# Patient Record
Sex: FEMALE | Employment: FULL TIME | URBAN - METROPOLITAN AREA
[De-identification: names, ages, dates, MRNs, and addresses within clinical notes are randomized per-mention and may not be internally consistent; named-entity substitution may affect disease eponyms.]

---

## 2018-04-17 ENCOUNTER — OFFICE VISIT (OUTPATIENT)
Dept: OBGYN CLINIC | Facility: CLINIC | Age: 38
End: 2018-04-17

## 2018-04-17 VITALS
SYSTOLIC BLOOD PRESSURE: 119 MMHG | WEIGHT: 152 LBS | DIASTOLIC BLOOD PRESSURE: 78 MMHG | BODY MASS INDEX: 28 KG/M2 | HEART RATE: 69 BPM

## 2018-04-17 DIAGNOSIS — Z01.419 WELL WOMAN EXAM: Primary | ICD-10-CM

## 2018-04-17 PROCEDURE — 99395 PREV VISIT EST AGE 18-39: CPT | Performed by: OBSTETRICS & GYNECOLOGY

## 2018-04-17 NOTE — PROGRESS NOTES
HPI:    Patient ID: Victorina Stoddard is a 40year old female. HPI  Well woman  Essentially new patient last seen 5 yrs ago  No c/o. Menses regular, monthly. LMP 3/20. One day premenstrual spot.  wishing for another child.   They're not using pr Normal hair distribution. No lesions. Neck: Normal range of motion. Adenopathy:  No supraclavicular or cervical adenopathy. Thyroid:  Normal size, shape, and position. No masses, tenderness, or nodules.   Cardiovascular: Normal rate and regular rhy normal/-HPV. Monthly self breast exam.  First mammogram at age 37 unless family history or other. Contraception discussed. Recommend regular exercise and quality diet. Return to clinic in one year or as needed.       Orders Placed This Encounter      Hp

## 2018-10-11 ENCOUNTER — OFFICE VISIT (OUTPATIENT)
Dept: OBGYN CLINIC | Facility: CLINIC | Age: 38
End: 2018-10-11
Payer: COMMERCIAL

## 2018-10-11 VITALS
BODY MASS INDEX: 28 KG/M2 | DIASTOLIC BLOOD PRESSURE: 76 MMHG | WEIGHT: 151.81 LBS | SYSTOLIC BLOOD PRESSURE: 112 MMHG | HEART RATE: 71 BPM

## 2018-10-11 DIAGNOSIS — N92.6 MISSED MENSES: Primary | ICD-10-CM

## 2018-10-11 PROCEDURE — 81025 URINE PREGNANCY TEST: CPT | Performed by: OBSTETRICS & GYNECOLOGY

## 2018-10-11 PROCEDURE — 99213 OFFICE O/P EST LOW 20 MIN: CPT | Performed by: OBSTETRICS & GYNECOLOGY

## 2018-10-11 NOTE — PROGRESS NOTES
HPI:    Patient ID: Jeremiah Dunn is a 41year old female. HPI  Missed menses visit  30-year-old  2 para 1 last menstrual period . That menstrual period was somewhat shorter and lighter than her typical.  Lasted 2 days.   Based on this [place]'s glands normal.  Urethral meatus- without lesions, mass, or discharge. Urethra- normal without lesion, cyst, mass, or tenderness. Vulva- normal.  Labia majora and minora without lesions. Vagina- normal, no lesions or discharge.   Moist and well s

## 2018-10-23 ENCOUNTER — OFFICE VISIT (OUTPATIENT)
Dept: OBGYN CLINIC | Facility: CLINIC | Age: 38
End: 2018-10-23
Payer: COMMERCIAL

## 2018-10-23 ENCOUNTER — NURSE ONLY (OUTPATIENT)
Dept: OBGYN CLINIC | Facility: CLINIC | Age: 38
End: 2018-10-23
Payer: COMMERCIAL

## 2018-10-23 ENCOUNTER — LAB ENCOUNTER (OUTPATIENT)
Dept: LAB | Facility: HOSPITAL | Age: 38
End: 2018-10-23
Attending: OBSTETRICS & GYNECOLOGY
Payer: COMMERCIAL

## 2018-10-23 VITALS — BODY MASS INDEX: 28 KG/M2 | SYSTOLIC BLOOD PRESSURE: 114 MMHG | DIASTOLIC BLOOD PRESSURE: 70 MMHG | WEIGHT: 151 LBS

## 2018-10-23 DIAGNOSIS — L81.8 DECORATIVE TATTOO: ICD-10-CM

## 2018-10-23 DIAGNOSIS — N92.6 MISSED MENSES: Primary | ICD-10-CM

## 2018-10-23 DIAGNOSIS — Z34.81 ENCOUNTER FOR SUPERVISION OF OTHER NORMAL PREGNANCY IN FIRST TRIMESTER: Primary | ICD-10-CM

## 2018-10-23 DIAGNOSIS — Z34.81 ENCOUNTER FOR SUPERVISION OF OTHER NORMAL PREGNANCY IN FIRST TRIMESTER: ICD-10-CM

## 2018-10-23 PROCEDURE — 86850 RBC ANTIBODY SCREEN: CPT

## 2018-10-23 PROCEDURE — 87086 URINE CULTURE/COLONY COUNT: CPT

## 2018-10-23 PROCEDURE — 86901 BLOOD TYPING SEROLOGIC RH(D): CPT

## 2018-10-23 PROCEDURE — 99212 OFFICE O/P EST SF 10 MIN: CPT | Performed by: OBSTETRICS & GYNECOLOGY

## 2018-10-23 PROCEDURE — 85025 COMPLETE CBC W/AUTO DIFF WBC: CPT

## 2018-10-23 PROCEDURE — 81003 URINALYSIS AUTO W/O SCOPE: CPT

## 2018-10-23 PROCEDURE — 36415 COLL VENOUS BLD VENIPUNCTURE: CPT

## 2018-10-23 PROCEDURE — 86900 BLOOD TYPING SEROLOGIC ABO: CPT

## 2018-10-23 PROCEDURE — 76817 TRANSVAGINAL US OBSTETRIC: CPT | Performed by: OBSTETRICS & GYNECOLOGY

## 2018-10-23 PROCEDURE — 86762 RUBELLA ANTIBODY: CPT

## 2018-10-23 PROCEDURE — 86803 HEPATITIS C AB TEST: CPT

## 2018-10-23 PROCEDURE — 86780 TREPONEMA PALLIDUM: CPT

## 2018-10-23 PROCEDURE — 87340 HEPATITIS B SURFACE AG IA: CPT

## 2018-10-23 PROCEDURE — 87389 HIV-1 AG W/HIV-1&-2 AB AG IA: CPT

## 2018-10-23 RX ORDER — VITAMIN A ACETATE, BETA CAROTENE, ASCORBIC ACID, CHOLECALCIFEROL, .ALPHA.-TOCOPHEROL ACETATE, DL-, THIAMINE MONONITRATE, RIBOFLAVIN, NIACINAMIDE, PYRIDOXINE HYDROCHLORIDE, FOLIC ACID, CYANOCOBALAMIN, CALCIUM CARBONATE, FERROUS FUMARATE, ZINC OXIDE, CUPRIC OXIDE 3080; 12; 120; 400; 1; 1.84; 3; 20; 22; 920; 25; 200; 27; 10; 2 [IU]/1; UG/1; MG/1; [IU]/1; MG/1; MG/1; MG/1; MG/1; MG/1; [IU]/1; MG/1; MG/1; MG/1; MG/1; MG/1
1 TABLET, FILM COATED ORAL DAILY
COMMUNITY

## 2018-10-23 NOTE — PROGRESS NOTES
HPI:    Patient ID: Yumiko Xiao is a 41year old female. HPI  OB follow-up  Complains of nausea that lingers throughout the day. Denies cramping or vaginal bleeding. Counseled on remedies for nausea and vomiting in pregnancy.   Last menstrual perio

## 2018-10-23 NOTE — PROGRESS NOTES
Pt is here today with  for RN [employer] Education.  Educational material reviewed with patient: Prenatal care, nutrition, weight gain recommendations, travel, exercise, intercourse, pregnancy changes, safe medications, pregnancy and work, fetal movement, la

## 2018-10-30 ENCOUNTER — TELEPHONE (OUTPATIENT)
Dept: OBGYN CLINIC | Facility: CLINIC | Age: 38
End: 2018-10-30

## 2018-10-30 NOTE — TELEPHONE ENCOUNTER
Agree with advice. Additionally, if her severe headache is NOT relieved, should go to ER for evaluation.

## 2018-10-30 NOTE — TELEPHONE ENCOUNTER
Pt reports intense headache, can not focus on screens, has been in the bed, feels incapacitated by pain which she says is constant at an 8 or 9/10. Pt has taken Tylenol (325 mg) and says it provides relief for only 15-30 mins. Last Tylenol taken at 16:30.

## 2018-10-30 NOTE — TELEPHONE ENCOUNTER
Notified pt of [facility] additional info.  Pt stated she will take it into consideration & verbalized an understanding

## 2018-10-30 NOTE — TELEPHONE ENCOUNTER
Patient is 9 weeks pregnant  calling stating patient has been having headache, would like to speak to nurse.

## 2018-11-20 ENCOUNTER — INITIAL PRENATAL (OUTPATIENT)
Dept: OBGYN CLINIC | Facility: CLINIC | Age: 38
End: 2018-11-20
Payer: COMMERCIAL

## 2018-11-20 ENCOUNTER — TELEPHONE (OUTPATIENT)
Dept: OBGYN CLINIC | Facility: CLINIC | Age: 38
End: 2018-11-20

## 2018-11-20 VITALS — DIASTOLIC BLOOD PRESSURE: 78 MMHG | BODY MASS INDEX: 27 KG/M2 | WEIGHT: 146.63 LBS | SYSTOLIC BLOOD PRESSURE: 114 MMHG

## 2018-11-20 DIAGNOSIS — Z34.81 ENCOUNTER FOR SUPERVISION OF OTHER NORMAL PREGNANCY IN FIRST TRIMESTER: Primary | ICD-10-CM

## 2018-11-20 PROBLEM — Z34.90 SUPERVISION OF NORMAL PREGNANCY (HCC): Status: RESOLVED | Noted: 2018-11-20 | Resolved: 2018-11-20

## 2018-11-20 PROBLEM — Z34.90 SUPERVISION OF NORMAL PREGNANCY (HCC): Status: ACTIVE | Noted: 2018-11-20

## 2018-11-20 PROBLEM — Z34.90 SUPERVISION OF NORMAL PREGNANCY: Status: ACTIVE | Noted: 2018-11-20

## 2018-11-20 PROBLEM — N92.6 MISSED MENSES: Status: RESOLVED | Noted: 2018-10-11 | Resolved: 2018-11-20

## 2018-11-20 PROBLEM — O09.529 AMA (ADVANCED MATERNAL AGE) MULTIGRAVIDA 35+: Status: ACTIVE | Noted: 2018-11-20

## 2018-11-20 PROBLEM — Z34.90 SUPERVISION OF NORMAL PREGNANCY: Status: RESOLVED | Noted: 2018-11-20 | Resolved: 2018-11-20

## 2018-11-20 PROBLEM — O09.529 AMA (ADVANCED MATERNAL AGE) MULTIGRAVIDA 35+ (HCC): Status: ACTIVE | Noted: 2018-11-20

## 2018-11-20 PROCEDURE — 81002 URINALYSIS NONAUTO W/O SCOPE: CPT | Performed by: OBSTETRICS & GYNECOLOGY

## 2018-11-20 RX ORDER — DOXYLAMINE SUCCINATE AND PYRIDOXINE HYDROCHLORIDE, DELAYED RELEASE TABLETS 10 MG/10 MG 10; 10 MG/1; MG/1
2 TABLET, DELAYED RELEASE ORAL NIGHTLY
Qty: 40 TABLET | Refills: 2 | Status: SHIPPED | OUTPATIENT
Start: 2018-11-20 | End: 2018-12-20

## 2018-11-20 NOTE — PROGRESS NOTES
Complains of daily nausea and poor appetite. Has lost 5 pounds. Has evening or nighttime emesis. Is using seasickness wrist bands, krishna and vitamin B6 pops. Would like to use medication and will prescribed likely just.  Counseled. Will Rx Diclegis.

## 2018-11-20 NOTE — TELEPHONE ENCOUNTER
1800- [id number] PA for Diclegis   Per pt has tried over the counter Unisom and B6 with no relief of nausea and vomiting and is continuing to loose weight.      Per PA dept, Medication has been approved for 12 months PA# [id number]     Contacted pharmacy to

## 2018-11-28 NOTE — TELEPHONE ENCOUNTER
Pt states that she started Dicglesis and states that she cannot tell if nausea and vomiting are getting better. States that during the day it has gotten better but her issue is usually in the evening.  Advised to keep well hydrated and if it becomes severe

## 2018-12-18 ENCOUNTER — ROUTINE PRENATAL (OUTPATIENT)
Dept: OBGYN CLINIC | Facility: CLINIC | Age: 38
End: 2018-12-18
Payer: COMMERCIAL

## 2018-12-18 VITALS — SYSTOLIC BLOOD PRESSURE: 117 MMHG | DIASTOLIC BLOOD PRESSURE: 78 MMHG | WEIGHT: 149 LBS | BODY MASS INDEX: 27 KG/M2

## 2018-12-18 DIAGNOSIS — O09.522 AMA (ADVANCED MATERNAL AGE) MULTIGRAVIDA 35+, SECOND TRIMESTER: ICD-10-CM

## 2018-12-18 DIAGNOSIS — Z34.92 NORMAL PREGNANCY IN SECOND TRIMESTER: Primary | ICD-10-CM

## 2018-12-18 DIAGNOSIS — O09.522 MULTIGRAVIDA OF ADVANCED MATERNAL AGE IN SECOND TRIMESTER: ICD-10-CM

## 2018-12-18 PROCEDURE — 81002 URINALYSIS NONAUTO W/O SCOPE: CPT | Performed by: OBSTETRICS & GYNECOLOGY

## 2018-12-18 NOTE — PROGRESS NOTES
Complains of daily constant headaches not alleviated with Tylenol. One day she was bedridden the entire day due to the headache. Describes it as varying from left-sided or right-sided pounding and associated with light sensitivity and motion sensitivity.

## 2019-01-07 ENCOUNTER — TELEPHONE (OUTPATIENT)
Dept: OBGYN CLINIC | Facility: CLINIC | Age: 39
End: 2019-01-07

## 2019-01-07 NOTE — TELEPHONE ENCOUNTER
Pt states that she had a trip planned for [place] and states this location is on the [facility] list. Pt states that she needs a letter from the provider verifying her pregnancy in order for her to be able to get a refund.  Pls advise

## 2019-01-07 NOTE — TELEPHONE ENCOUNTER
Pt need note confirming she is pregnant to be able to be refunded for [employer].  Since pt is pregnant not able to take trip

## 2019-01-17 ENCOUNTER — ROUTINE PRENATAL (OUTPATIENT)
Dept: OBGYN CLINIC | Facility: CLINIC | Age: 39
End: 2019-01-17
Payer: COMMERCIAL

## 2019-01-17 VITALS — BODY MASS INDEX: 27 KG/M2 | SYSTOLIC BLOOD PRESSURE: 114 MMHG | DIASTOLIC BLOOD PRESSURE: 60 MMHG | WEIGHT: 149.63 LBS

## 2019-01-17 DIAGNOSIS — Z34.83 ENCOUNTER FOR SUPERVISION OF OTHER NORMAL PREGNANCY IN THIRD TRIMESTER: Primary | ICD-10-CM

## 2019-01-17 LAB
APPEARANCE: CLEAR
MULTISTIX LOT#: NORMAL NUMERIC
PH, URINE: 6 (ref 4.5–8)
SPECIFIC GRAVITY: 1.01 (ref 1–1.03)
URINE-COLOR: YELLOW
UROBILINOGEN,SEMI-QN: 0.2 MG/DL (ref 0–1.9)

## 2019-01-17 PROCEDURE — 81002 URINALYSIS NONAUTO W/O SCOPE: CPT | Performed by: OBSTETRICS & GYNECOLOGY

## 2019-01-18 NOTE — PROGRESS NOTES
Outpatient Maternal-Fetal Medicine Consultation    Dear Dr. France Guerin,    Thank you for requesting ultrasound evaluation and maternal fetal medicine consultation on your patient Steven Reyes.   As you are aware she is a 41year old female with a romero pr non-tender, no edema      OBSTETRIC ULTRASOUND  A level II ultrasound was performed prior to the consultation which I interpreted and discussed with the patient and her significant other, Bandar Mujica. The fetal measurements are consistent with dates.   The fetal gestational diabetes in the general obstetric population is 3 percent, rising to 7 to 15 percent in women over age 36 and 21 percent in women over age 48. Women 28years of age or older are more likely to be delivered by .  The  delivery ra patient will be 46years old at the time of delivery I reviewed that her risk (at amniocentesis) of having a fetus with any chromosome abnormality is 1:60 and with trisomy 24 is 1: 110.     Invasive Testing  I offered invasive genetic testing (amniocentesis

## 2019-01-21 ENCOUNTER — HOSPITAL ENCOUNTER (OUTPATIENT)
Dept: PERINATAL CARE | Facility: HOSPITAL | Age: 39
Discharge: HOME | End: 2019-01-21
Attending: OBSTETRICS & GYNECOLOGY
Payer: COMMERCIAL

## 2019-01-21 VITALS
DIASTOLIC BLOOD PRESSURE: 71 MMHG | BODY MASS INDEX: 27 KG/M2 | SYSTOLIC BLOOD PRESSURE: 110 MMHG | HEART RATE: 89 BPM | WEIGHT: 149 LBS

## 2019-01-21 DIAGNOSIS — O09.522 MULTIGRAVIDA OF ADVANCED MATERNAL AGE IN SECOND TRIMESTER: Primary | ICD-10-CM

## 2019-01-21 DIAGNOSIS — O09.522 MULTIGRAVIDA OF ADVANCED MATERNAL AGE IN SECOND TRIMESTER: ICD-10-CM

## 2019-01-21 PROCEDURE — 76811 OB US DETAILED SNGL FETUS: CPT | Performed by: OBSTETRICS & GYNECOLOGY

## 2019-01-21 PROCEDURE — 99243 OFF/OP CNSLTJ NEW/EST LOW 30: CPT | Performed by: OBSTETRICS & GYNECOLOGY

## 2019-02-23 ENCOUNTER — ROUTINE PRENATAL (OUTPATIENT)
Dept: OBGYN CLINIC | Facility: CLINIC | Age: 39
End: 2019-02-23
Payer: COMMERCIAL

## 2019-02-23 VITALS — DIASTOLIC BLOOD PRESSURE: 70 MMHG | BODY MASS INDEX: 28 KG/M2 | WEIGHT: 155.19 LBS | SYSTOLIC BLOOD PRESSURE: 110 MMHG

## 2019-02-23 DIAGNOSIS — Z34.82 ENCOUNTER FOR SUPERVISION OF OTHER NORMAL PREGNANCY IN SECOND TRIMESTER: Primary | ICD-10-CM

## 2019-02-23 LAB
APPEARANCE: CLEAR
MULTISTIX LOT#: NORMAL NUMERIC
SPECIFIC GRAVITY: 1.03 (ref 1–1.03)
URINE-COLOR: YELLOW
UROBILINOGEN,SEMI-QN: 0.2 MG/DL (ref 0–1.9)

## 2019-02-23 PROCEDURE — 81002 URINALYSIS NONAUTO W/O SCOPE: CPT | Performed by: OBSTETRICS & GYNECOLOGY

## 2019-03-29 ENCOUNTER — LAB ENCOUNTER (OUTPATIENT)
Dept: LAB | Facility: HOSPITAL | Age: 39
End: 2019-03-29
Attending: OBSTETRICS & GYNECOLOGY
Payer: COMMERCIAL

## 2019-03-29 ENCOUNTER — ROUTINE PRENATAL (OUTPATIENT)
Dept: OBGYN CLINIC | Facility: CLINIC | Age: 39
End: 2019-03-29
Payer: COMMERCIAL

## 2019-03-29 VITALS
DIASTOLIC BLOOD PRESSURE: 72 MMHG | SYSTOLIC BLOOD PRESSURE: 115 MMHG | WEIGHT: 157 LBS | HEART RATE: 86 BPM | BODY MASS INDEX: 29 KG/M2

## 2019-03-29 DIAGNOSIS — Z34.82 ENCOUNTER FOR SUPERVISION OF OTHER NORMAL PREGNANCY IN SECOND TRIMESTER: ICD-10-CM

## 2019-03-29 DIAGNOSIS — Z34.93 NORMAL PREGNANCY IN THIRD TRIMESTER: Primary | ICD-10-CM

## 2019-03-29 DIAGNOSIS — O09.523 MULTIGRAVIDA OF ADVANCED MATERNAL AGE IN THIRD TRIMESTER: ICD-10-CM

## 2019-03-29 PROBLEM — Z01.419 WELL WOMAN EXAM: Status: RESOLVED | Noted: 2018-04-17 | Resolved: 2019-03-29

## 2019-03-29 LAB
BASOPHILS # BLD AUTO: 0.03 X10(3) UL (ref 0–0.2)
BASOPHILS NFR BLD AUTO: 0.4 %
DEPRECATED RDW RBC AUTO: 41.3 FL (ref 35.1–46.3)
EOSINOPHIL # BLD AUTO: 0.02 X10(3) UL (ref 0–0.7)
EOSINOPHIL NFR BLD AUTO: 0.3 %
ERYTHROCYTE [DISTWIDTH] IN BLOOD BY AUTOMATED COUNT: 12.7 % (ref 11–15)
GLUCOSE 1H P GLC SERPL-MCNC: 133 MG/DL
HCT VFR BLD AUTO: 35.7 % (ref 35–48)
HGB BLD-MCNC: 11.8 G/DL (ref 12–16)
IMM GRANULOCYTES # BLD AUTO: 0.03 X10(3) UL (ref 0–1)
IMM GRANULOCYTES NFR BLD: 0.4 %
LYMPHOCYTES # BLD AUTO: 1.59 X10(3) UL (ref 1–4)
LYMPHOCYTES NFR BLD AUTO: 21.7 %
MCH RBC QN AUTO: 29.1 PG (ref 26–34)
MCHC RBC AUTO-ENTMCNC: 33.1 G/DL (ref 31–37)
MCV RBC AUTO: 88.1 FL (ref 80–100)
MONOCYTES # BLD AUTO: 0.41 X10(3) UL (ref 0.1–1)
MONOCYTES NFR BLD AUTO: 5.6 %
MULTISTIX LOT#: NORMAL NUMERIC
NEUTROPHILS # BLD AUTO: 5.25 X10 (3) UL (ref 1.5–7.7)
NEUTROPHILS # BLD AUTO: 5.25 X10(3) UL (ref 1.5–7.7)
NEUTROPHILS NFR BLD AUTO: 71.6 %
PH, URINE: 7 (ref 4.5–8)
PLATELET # BLD AUTO: 215 10(3)UL (ref 150–450)
RBC # BLD AUTO: 4.05 X10(6)UL (ref 3.8–5.3)
SPECIFIC GRAVITY: 1.02 (ref 1–1.03)
URINE-COLOR: YELLOW
UROBILINOGEN,SEMI-QN: 0.2 MG/DL (ref 0–1.9)
WBC # BLD AUTO: 7.3 X10(3) UL (ref 4–11)

## 2019-03-29 PROCEDURE — 82950 GLUCOSE TEST: CPT

## 2019-03-29 PROCEDURE — 85025 COMPLETE CBC W/AUTO DIFF WBC: CPT

## 2019-03-29 PROCEDURE — 81002 URINALYSIS NONAUTO W/O SCOPE: CPT | Performed by: OBSTETRICS & GYNECOLOGY

## 2019-03-29 PROCEDURE — 36415 COLL VENOUS BLD VENIPUNCTURE: CPT

## 2019-04-13 ENCOUNTER — ROUTINE PRENATAL (OUTPATIENT)
Dept: OBGYN CLINIC | Facility: CLINIC | Age: 39
End: 2019-04-13
Payer: COMMERCIAL

## 2019-04-13 VITALS
WEIGHT: 158 LBS | BODY MASS INDEX: 29 KG/M2 | HEART RATE: 76 BPM | DIASTOLIC BLOOD PRESSURE: 72 MMHG | SYSTOLIC BLOOD PRESSURE: 109 MMHG

## 2019-04-13 DIAGNOSIS — Z34.83 ENCOUNTER FOR SUPERVISION OF OTHER NORMAL PREGNANCY IN THIRD TRIMESTER: Primary | ICD-10-CM

## 2019-04-13 PROCEDURE — 81002 URINALYSIS NONAUTO W/O SCOPE: CPT | Performed by: OBSTETRICS & GYNECOLOGY

## 2019-04-13 NOTE — PROGRESS NOTES
[facility]  Obstetrics and Gynecology  Prenatal Visit  Amor Florentino MD    SHAHID Gómez is a 41year old.o.  31w5d weeks. Here for routine prenatal visit and is without complaints.   Patient denies any regular uterine contractions, spon

## 2019-04-24 ENCOUNTER — HOSPITAL ENCOUNTER (OUTPATIENT)
Dept: PERINATAL CARE | Facility: HOSPITAL | Age: 39
Discharge: HOME | End: 2019-04-24
Attending: OBSTETRICS & GYNECOLOGY
Payer: COMMERCIAL

## 2019-04-24 VITALS
BODY MASS INDEX: 29 KG/M2 | HEART RATE: 73 BPM | SYSTOLIC BLOOD PRESSURE: 118 MMHG | WEIGHT: 158 LBS | DIASTOLIC BLOOD PRESSURE: 75 MMHG

## 2019-04-24 DIAGNOSIS — O09.523 MULTIGRAVIDA OF ADVANCED MATERNAL AGE IN THIRD TRIMESTER: Primary | ICD-10-CM

## 2019-04-24 DIAGNOSIS — O09.523 MULTIGRAVIDA OF ADVANCED MATERNAL AGE IN THIRD TRIMESTER: ICD-10-CM

## 2019-04-24 PROCEDURE — 99213 OFFICE O/P EST LOW 20 MIN: CPT | Performed by: OBSTETRICS & GYNECOLOGY

## 2019-04-24 PROCEDURE — 76805 OB US >/= 14 WKS SNGL FETUS: CPT | Performed by: OBSTETRICS & GYNECOLOGY

## 2019-04-24 PROCEDURE — 76816 OB US FOLLOW-UP PER FETUS: CPT | Performed by: OBSTETRICS & GYNECOLOGY

## 2019-04-24 NOTE — PROGRESS NOTES
Outpatient Maternal-Fetal Medicine Consultation    Dear Dr. France Guerin,    Thank you for requesting ultrasound evaluation and maternal fetal medicine consultation on your patient Steven Reyes.   As you are aware she is a 41year old female with a romero pr diabetes  · Intrauterine fetal death        OB ULTRASOUND REPORT   See imaging tab for complete ultrasound report or in PACS    Fetal Heart Rate: Present 141 bpm  Fetal Presentation: Vertex  Amniotic fluid MVP: 14.3 cm  Cord: 3 vessel cord  Placental Locat

## 2019-05-01 ENCOUNTER — TELEPHONE (OUTPATIENT)
Dept: OBGYN CLINIC | Facility: CLINIC | Age: 39
End: 2019-05-01

## 2019-05-01 DIAGNOSIS — R73.09 ELEVATED GLUCOSE TOLERANCE TEST: Primary | ICD-10-CM

## 2019-05-01 NOTE — TELEPHONE ENCOUNTER
Notes recorded by Ferny Stewart MD on 3/30/2019 at 9:34 AM CDT  1 hr gtt elevated  Please inform and help pt schedule 3 hr gtt. Judy Mabry,     Your 1 hour glucose test has come back elevated.  You will need to call Central Scheduling to s

## 2019-05-01 NOTE — TELEPHONE ENCOUNTER
Pt informed regarding the 3 hour gtt that is needed. States that she doesn't understand why needs to complete it as she has not had any symptoms. Advised pt that it is of high importance she has it done to r/o gestational diabetes.  Pt states she has an upc

## 2019-05-03 ENCOUNTER — TELEPHONE (OUTPATIENT)
Dept: OBGYN CLINIC | Facility: CLINIC | Age: 39
End: 2019-05-03

## 2019-05-07 ENCOUNTER — ROUTINE PRENATAL (OUTPATIENT)
Dept: OBGYN CLINIC | Facility: CLINIC | Age: 39
End: 2019-05-07
Payer: COMMERCIAL

## 2019-05-07 ENCOUNTER — LABORATORY ENCOUNTER (OUTPATIENT)
Dept: LAB | Facility: HOSPITAL | Age: 39
End: 2019-05-07
Attending: OBSTETRICS & GYNECOLOGY
Payer: COMMERCIAL

## 2019-05-07 VITALS
HEART RATE: 71 BPM | DIASTOLIC BLOOD PRESSURE: 85 MMHG | WEIGHT: 160 LBS | SYSTOLIC BLOOD PRESSURE: 123 MMHG | BODY MASS INDEX: 29 KG/M2

## 2019-05-07 DIAGNOSIS — Z34.83 ENCOUNTER FOR SUPERVISION OF OTHER NORMAL PREGNANCY IN THIRD TRIMESTER: ICD-10-CM

## 2019-05-07 DIAGNOSIS — Z23 NEED FOR VACCINATION: ICD-10-CM

## 2019-05-07 DIAGNOSIS — Z34.83 ENCOUNTER FOR SUPERVISION OF OTHER NORMAL PREGNANCY IN THIRD TRIMESTER: Primary | ICD-10-CM

## 2019-05-07 DIAGNOSIS — R73.09 ELEVATED GLUCOSE TOLERANCE TEST: ICD-10-CM

## 2019-05-07 PROCEDURE — 90715 TDAP VACCINE 7 YRS/> IM: CPT | Performed by: ADVANCED PRACTICE MIDWIFE

## 2019-05-07 PROCEDURE — 82952 GTT-ADDED SAMPLES: CPT

## 2019-05-07 PROCEDURE — 36415 COLL VENOUS BLD VENIPUNCTURE: CPT

## 2019-05-07 PROCEDURE — 90471 IMMUNIZATION ADMIN: CPT | Performed by: ADVANCED PRACTICE MIDWIFE

## 2019-05-07 PROCEDURE — 86780 TREPONEMA PALLIDUM: CPT

## 2019-05-07 PROCEDURE — 85025 COMPLETE CBC W/AUTO DIFF WBC: CPT

## 2019-05-07 PROCEDURE — 81002 URINALYSIS NONAUTO W/O SCOPE: CPT | Performed by: ADVANCED PRACTICE MIDWIFE

## 2019-05-07 PROCEDURE — 82951 GLUCOSE TOLERANCE TEST (GTT): CPT

## 2019-05-07 PROCEDURE — 87389 HIV-1 AG W/HIV-1&-2 AB AG IA: CPT

## 2019-05-07 NOTE — PROGRESS NOTES
S.  Denies ZEE, vision change, URQ pain, swelling. Baby is active, its a boy,  Employed on maternity leave, is a  in [place] [id number].   C/o painful lump on vulva  O  See above, draining follicular cyst on right labia majora  A.  35 wk AGA  AMA  Folliculi

## 2019-05-21 ENCOUNTER — HOSPITAL ENCOUNTER (OUTPATIENT)
Facility: HOSPITAL | Age: 39
Setting detail: OBSERVATION
Discharge: HOME | End: 2019-05-21
Attending: OBSTETRICS & GYNECOLOGY | Admitting: OBSTETRICS & GYNECOLOGY
Payer: COMMERCIAL

## 2019-05-21 ENCOUNTER — ROUTINE PRENATAL (OUTPATIENT)
Dept: OBGYN CLINIC | Facility: CLINIC | Age: 39
End: 2019-05-21
Payer: COMMERCIAL

## 2019-05-21 VITALS — BODY MASS INDEX: 29 KG/M2 | SYSTOLIC BLOOD PRESSURE: 118 MMHG | DIASTOLIC BLOOD PRESSURE: 74 MMHG | WEIGHT: 157 LBS

## 2019-05-21 VITALS — DIASTOLIC BLOOD PRESSURE: 80 MMHG | HEART RATE: 61 BPM | SYSTOLIC BLOOD PRESSURE: 130 MMHG

## 2019-05-21 DIAGNOSIS — Z34.93 NORMAL PREGNANCY IN THIRD TRIMESTER: Primary | ICD-10-CM

## 2019-05-21 PROBLEM — Z34.90 PREGNANCY: Status: ACTIVE | Noted: 2019-05-21

## 2019-05-21 PROBLEM — Z34.90 PREGNANCY (HCC): Status: ACTIVE | Noted: 2019-05-21

## 2019-05-21 PROCEDURE — 59025 FETAL NON-STRESS TEST: CPT

## 2019-05-21 PROCEDURE — 81002 URINALYSIS NONAUTO W/O SCOPE: CPT | Performed by: OBSTETRICS & GYNECOLOGY

## 2019-05-21 PROCEDURE — 99202 OFFICE O/P NEW SF 15 MIN: CPT

## 2019-05-21 NOTE — PROGRESS NOTES
Pt is a 41year old female admitted to TR1/TR1-A. Patient presents with:  Non-stress Test: pt. here for NST for AMA   Pt is  37w1d intra-uterine pregnancy. History obtained, consents signed. Oriented to room, staff, and plan of care.

## 2019-05-21 NOTE — PROGRESS NOTES
To go to Triage for first NST today for AMA. Patient forgot to schedule appointment with MFM last week. Patient will schedule NST with MFM office for next week. Labor Precautions reviewed.

## 2019-05-21 NOTE — TRIAGE
[facility]      Triage Note    Luis Marie Patient Status:  Observation    1980 MRN [id number]   Location [facility] Attending Irving Frankel, Kathyrn Kenner, MD   Hosp Day # 0 PCP MD Brooklyn Shearer instructions to follow up. Discharged home  Ambulatory and in stable condition with written and verbal labor, and preeclampsia instructions. Patient verbalizes understanding of information given.        Ann Lou RN  5/21/2019 11:34 AM

## 2019-05-24 ENCOUNTER — PATIENT MESSAGE (OUTPATIENT)
Dept: OBGYN CLINIC | Facility: CLINIC | Age: 39
End: 2019-05-24

## 2019-05-24 NOTE — PROGRESS NOTES
OB History     T0    L1    SAB0  TAB0  Ectopic0  Multiple0  Live Births1     Pt is 37w4d. Pt states that she has felt the itching for about two weeks but never mentioned it since she thought it would go away.  Pt states that now itching has

## 2019-05-24 NOTE — TELEPHONE ENCOUNTER
From: Soledad Mccallum  To: Avinash Kemp MD  Sent: 5/24/2019 3:07 PM CDT  Subject: Non-Urgent Medical Question    The past two weeks my skin has been extremely itchy. It’s all over, mostly at night although it happens during the day as well.  I haven’t

## 2019-05-25 ENCOUNTER — HOSPITAL ENCOUNTER (INPATIENT)
Facility: HOSPITAL | Age: 39
LOS: 3 days | Discharge: HOME | End: 2019-05-28
Attending: OBSTETRICS & GYNECOLOGY | Admitting: OBSTETRICS & GYNECOLOGY
Payer: COMMERCIAL

## 2019-05-25 PROBLEM — O26.613 CHOLESTASIS DURING PREGNANCY IN THIRD TRIMESTER: Status: ACTIVE | Noted: 2019-05-25

## 2019-05-25 PROBLEM — O99.113 BENIGN GESTATIONAL THROMBOCYTOPENIA IN THIRD TRIMESTER (HCC): Status: ACTIVE | Noted: 2019-05-25

## 2019-05-25 PROBLEM — K83.1 CHOLESTASIS DURING PREGNANCY IN THIRD TRIMESTER: Status: ACTIVE | Noted: 2019-05-25

## 2019-05-25 PROBLEM — D69.6 BENIGN GESTATIONAL THROMBOCYTOPENIA IN THIRD TRIMESTER (HCC): Status: ACTIVE | Noted: 2019-05-25

## 2019-05-25 PROBLEM — Z34.93 NORMAL PREGNANCY IN THIRD TRIMESTER: Status: RESOLVED | Noted: 2018-11-20 | Resolved: 2019-05-25

## 2019-05-25 PROBLEM — O26.643 CHOLESTASIS DURING PREGNANCY IN THIRD TRIMESTER (HCC): Status: ACTIVE | Noted: 2019-05-25

## 2019-05-25 PROBLEM — Z34.93 NORMAL PREGNANCY IN THIRD TRIMESTER (HCC): Status: RESOLVED | Noted: 2018-11-20 | Resolved: 2019-05-25

## 2019-05-25 PROCEDURE — 3E033VJ INTRODUCTION OF OTHER HORMONE INTO PERIPHERAL VEIN, PERCUTANEOUS APPROACH: ICD-10-PCS | Performed by: OBSTETRICS & GYNECOLOGY

## 2019-05-25 RX ORDER — AMMONIA INHALANTS 0.04 G/.3ML
0.3 INHALANT RESPIRATORY (INHALATION) AS NEEDED
Status: DISCONTINUED | OUTPATIENT
Start: 2019-05-25 | End: 2019-05-26

## 2019-05-25 RX ORDER — SODIUM CHLORIDE, SODIUM LACTATE, POTASSIUM CHLORIDE, CALCIUM CHLORIDE 600; 310; 30; 20 MG/100ML; MG/100ML; MG/100ML; MG/100ML
INJECTION, SOLUTION INTRAVENOUS CONTINUOUS
Status: DISCONTINUED | OUTPATIENT
Start: 2019-05-25 | End: 2019-05-26

## 2019-05-25 RX ORDER — CALCIUM CARBONATE 200(500)MG
2 TABLET,CHEWABLE ORAL 2 TIMES DAILY
Status: ON HOLD | COMMUNITY
End: 2019-05-28

## 2019-05-25 RX ORDER — DIPHENHYDRAMINE HCL 25 MG
25 CAPSULE ORAL EVERY 6 HOURS PRN
Status: DISCONTINUED | OUTPATIENT
Start: 2019-05-25 | End: 2019-05-26

## 2019-05-25 RX ORDER — DEXTROSE, SODIUM CHLORIDE, SODIUM LACTATE, POTASSIUM CHLORIDE, AND CALCIUM CHLORIDE 5; .6; .31; .03; .02 G/100ML; G/100ML; G/100ML; G/100ML; G/100ML
INJECTION, SOLUTION INTRAVENOUS CONTINUOUS
Status: DISCONTINUED | OUTPATIENT
Start: 2019-05-25 | End: 2019-05-26

## 2019-05-25 RX ORDER — URSODIOL 300 MG/1
300 CAPSULE ORAL 2 TIMES DAILY
Status: DISCONTINUED | OUTPATIENT
Start: 2019-05-25 | End: 2019-05-25

## 2019-05-25 RX ORDER — IBUPROFEN 600 MG/1
600 TABLET ORAL ONCE AS NEEDED
Status: DISCONTINUED | OUTPATIENT
Start: 2019-05-25 | End: 2019-05-26

## 2019-05-25 RX ORDER — DIPHENHYDRAMINE HCL 50 MG
50 CAPSULE ORAL EVERY 6 HOURS PRN
COMMUNITY

## 2019-05-25 RX ORDER — TERBUTALINE SULFATE 1 MG/ML
0.25 INJECTION, SOLUTION SUBCUTANEOUS AS NEEDED
Status: DISCONTINUED | OUTPATIENT
Start: 2019-05-25 | End: 2019-05-26

## 2019-05-25 RX ORDER — LIDOCAINE HYDROCHLORIDE 10 MG/ML
30 INJECTION, SOLUTION EPIDURAL; INFILTRATION; INTRACAUDAL; PERINEURAL ONCE
Status: DISCONTINUED | OUTPATIENT
Start: 2019-05-25 | End: 2019-05-26

## 2019-05-25 RX ORDER — ACETAMINOPHEN 325 MG/1
650 TABLET ORAL EVERY 6 HOURS PRN
Status: ON HOLD | COMMUNITY
End: 2019-05-28

## 2019-05-25 RX ORDER — SODIUM CHLORIDE 0.9 % (FLUSH) 0.9 %
10 SYRINGE (ML) INJECTION AS NEEDED
Status: DISCONTINUED | OUTPATIENT
Start: 2019-05-25 | End: 2019-05-26

## 2019-05-25 RX ORDER — URSODIOL 300 MG/1
300 CAPSULE ORAL 2 TIMES DAILY
Status: DISCONTINUED | OUTPATIENT
Start: 2019-05-25 | End: 2019-05-26

## 2019-05-25 RX ORDER — TRISODIUM CITRATE DIHYDRATE AND CITRIC ACID MONOHYDRATE 500; 334 MG/5ML; MG/5ML
30 SOLUTION ORAL AS NEEDED
Status: DISCONTINUED | OUTPATIENT
Start: 2019-05-25 | End: 2019-05-26

## 2019-05-25 NOTE — H&P
106 Mendy Mendez Patient Status:  Inpatient    1980 MRN [id number]   Location [facility] Attending Piper Davison MD   Hosp Day # 0 PCP Corey García MD     Date Cancer Maternal Grandmother      Social History: Social History    Tobacco Use      Smoking status: Never Smoker      Smokeless tobacco: Never Used    Alcohol use: Yes      Comment: occasionally       Allergies/Medications:    Allergies:     Penicillins Assessment/Plan:   Assessment:  Problems: Patient Active Problem List:     AMA (advanced maternal age) multigravida 35+     Pregnancy     Cholestasis during pregnancy in third trimester     Benign gestational thrombocytopenia in third trimester ([facility]

## 2019-05-25 NOTE — PROGRESS NOTES
POC discussed with pt, questions answered and pt verbalizes understanding. Bedside report given to Roxanne Thorne RN. Pt transferred via ambulatory with belonging to LDR 2 accompanied by Roxanne Thorne RN.

## 2019-05-25 NOTE — PROGRESS NOTES
POC discussed with pt, verbalizes understanding. Lab work obtained and sent. PO hydration given. Bed in low locked position, call light within reach. Will continue to monitor.

## 2019-05-25 NOTE — PROGRESS NOTES
Pt is a 41year old female admitted to [place] [address]. Pt is  37w5d intra-uterine pregnancy. History obtained, consents signed. Oriented to room, staff, and plan of care. Admitted for IOL for Cholestasis of pregnancy.

## 2019-05-25 NOTE — PROGRESS NOTES
Augusto Corona MD  You 15 hours ago (4:44 PM)      Patient should go to [facility] immediately for an NST and we will draw Bile Acids there.  You can tell the patient that we are looking for something called Cholestasis that is a Liver disease in preg

## 2019-05-25 NOTE — PROGRESS NOTES
Pt called office and was informed of ML's recommendation. She will be heading over to [facility] today. [facility] nurse Triage [facility] informed.

## 2019-05-25 NOTE — TRIAGE
[facility]      Triage Note    Maru Ames Patient Status:  Inpatient    1980 MRN [id number]   [address]  Attending Noah Thompson MD   Hosp Day # 0 PCP MD Nuzhat Pettit Interpretation: Reactive          FHR Category: Category I           Additional Comments       Reason for visit: pt here states allover body itching x2 weeks, denies any LOF, ctxs or vaginal bleeding, states +FM. EFM explained and applied, VS obtained.  Ass

## 2019-05-25 NOTE — PROGRESS NOTES
Patient does state that she has lower right groin pain several times a week that is intermittent for the past 3 weeks, along with lightheadedness and dizziness daily x1 month.  Patient states that over the last two weeks she has noticed yellow colored stool

## 2019-05-25 NOTE — PROGRESS NOTES
Pt is a 41year old female admitted to TR1/TR1-A. Patient presents with:   Assessment: pt sent over by office to be seen for overall body itching x2 weeks.  Pt states +FM, denies any vaginal bleeding, LOF or ctxs     Pt is  37w5d intra-uterin

## 2019-05-26 ENCOUNTER — ANESTHESIA EVENT (OUTPATIENT)
Dept: OBGYN UNIT | Facility: HOSPITAL | Age: 39
End: 2019-05-26
Payer: COMMERCIAL

## 2019-05-26 ENCOUNTER — ANESTHESIA (OUTPATIENT)
Dept: OBGYN UNIT | Facility: HOSPITAL | Age: 39
End: 2019-05-26
Payer: COMMERCIAL

## 2019-05-26 PROBLEM — R33.9 URINARY RETENTION: Status: ACTIVE | Noted: 2019-05-26

## 2019-05-26 PROCEDURE — 0UCG7ZZ EXTIRPATION OF MATTER FROM VAGINA, VIA NATURAL OR ARTIFICIAL OPENING: ICD-10-PCS | Performed by: OBSTETRICS & GYNECOLOGY

## 2019-05-26 PROCEDURE — 10907ZC DRAINAGE OF AMNIOTIC FLUID, THERAPEUTIC FROM PRODUCTS OF CONCEPTION, VIA NATURAL OR ARTIFICIAL OPENING: ICD-10-PCS | Performed by: OBSTETRICS & GYNECOLOGY

## 2019-05-26 PROCEDURE — 59400 OBSTETRICAL CARE: CPT | Performed by: OBSTETRICS & GYNECOLOGY

## 2019-05-26 PROCEDURE — 0HQ9XZZ REPAIR PERINEUM SKIN, EXTERNAL APPROACH: ICD-10-PCS | Performed by: OBSTETRICS & GYNECOLOGY

## 2019-05-26 RX ORDER — LIDOCAINE HYDROCHLORIDE AND EPINEPHRINE 20; 5 MG/ML; UG/ML
20 INJECTION, SOLUTION EPIDURAL; INFILTRATION; INTRACAUDAL; PERINEURAL ONCE
Status: DISCONTINUED | OUTPATIENT
Start: 2019-05-26 | End: 2019-05-26

## 2019-05-26 RX ORDER — ONDANSETRON 2 MG/ML
4 INJECTION INTRAMUSCULAR; INTRAVENOUS EVERY 6 HOURS PRN
Status: DISCONTINUED | OUTPATIENT
Start: 2019-05-26 | End: 2019-05-28

## 2019-05-26 RX ORDER — DOCUSATE SODIUM 100 MG/1
100 CAPSULE, LIQUID FILLED ORAL 2 TIMES DAILY
Status: DISCONTINUED | OUTPATIENT
Start: 2019-05-26 | End: 2019-05-28

## 2019-05-26 RX ORDER — BUPIVACAINE HYDROCHLORIDE 2.5 MG/ML
INJECTION, SOLUTION EPIDURAL; INFILTRATION; INTRACAUDAL
Status: COMPLETED | OUTPATIENT
Start: 2019-05-26 | End: 2019-05-26

## 2019-05-26 RX ORDER — IBUPROFEN 600 MG/1
600 TABLET ORAL EVERY 4 HOURS PRN
Status: DISCONTINUED | OUTPATIENT
Start: 2019-05-26 | End: 2019-05-28

## 2019-05-26 RX ORDER — AMMONIA INHALANTS 0.04 G/.3ML
0.3 INHALANT RESPIRATORY (INHALATION) AS NEEDED
Status: DISCONTINUED | OUTPATIENT
Start: 2019-05-26 | End: 2019-05-28

## 2019-05-26 RX ORDER — CHOLECALCIFEROL (VITAMIN D3) 25 MCG
1 TABLET,CHEWABLE ORAL DAILY
Status: DISCONTINUED | OUTPATIENT
Start: 2019-05-26 | End: 2019-05-28

## 2019-05-26 RX ORDER — URSODIOL 300 MG/1
300 CAPSULE ORAL 2 TIMES DAILY
Status: DISCONTINUED | OUTPATIENT
Start: 2019-05-26 | End: 2019-05-28

## 2019-05-26 RX ORDER — ACETAMINOPHEN 325 MG/1
650 TABLET ORAL EVERY 6 HOURS PRN
Status: DISCONTINUED | OUTPATIENT
Start: 2019-05-26 | End: 2019-05-26

## 2019-05-26 RX ORDER — IBUPROFEN 200 MG
400 TABLET ORAL EVERY 4 HOURS PRN
Status: DISCONTINUED | OUTPATIENT
Start: 2019-05-26 | End: 2019-05-28

## 2019-05-26 RX ORDER — LIDOCAINE HYDROCHLORIDE AND EPINEPHRINE 15; 5 MG/ML; UG/ML
INJECTION, SOLUTION EPIDURAL
Status: COMPLETED | OUTPATIENT
Start: 2019-05-26 | End: 2019-05-26

## 2019-05-26 RX ORDER — SIMETHICONE 80 MG
80 TABLET,CHEWABLE ORAL 3 TIMES DAILY PRN
Status: DISCONTINUED | OUTPATIENT
Start: 2019-05-26 | End: 2019-05-28

## 2019-05-26 RX ORDER — EPHEDRINE SULFATE/0.9% NACL/PF 25 MG/5 ML
5 SYRINGE (ML) INTRAVENOUS AS NEEDED
Status: DISCONTINUED | OUTPATIENT
Start: 2019-05-26 | End: 2019-05-26

## 2019-05-26 RX ORDER — SODIUM CHLORIDE 0.9 % (FLUSH) 0.9 %
10 SYRINGE (ML) INJECTION AS NEEDED
Status: DISCONTINUED | OUTPATIENT
Start: 2019-05-26 | End: 2019-05-28

## 2019-05-26 RX ORDER — SODIUM CHLORIDE, SODIUM LACTATE, POTASSIUM CHLORIDE, CALCIUM CHLORIDE 600; 310; 30; 20 MG/100ML; MG/100ML; MG/100ML; MG/100ML
INJECTION, SOLUTION INTRAVENOUS CONTINUOUS
Status: DISCONTINUED | OUTPATIENT
Start: 2019-05-26 | End: 2019-05-28

## 2019-05-26 RX ORDER — LIDOCAINE HYDROCHLORIDE 10 MG/ML
INJECTION, SOLUTION INFILTRATION; PERINEURAL
Status: COMPLETED | OUTPATIENT
Start: 2019-05-26 | End: 2019-05-26

## 2019-05-26 RX ORDER — DIPHENHYDRAMINE HCL 50 MG
50 CAPSULE ORAL EVERY 6 HOURS PRN
Status: DISCONTINUED | OUTPATIENT
Start: 2019-05-26 | End: 2019-05-28

## 2019-05-26 RX ORDER — MISOPROSTOL 200 UG/1
TABLET ORAL
Status: COMPLETED
Start: 2019-05-26 | End: 2019-05-26

## 2019-05-26 RX ORDER — DIAPER,BRIEF,INFANT-TODD,DISP
1 EACH MISCELLANEOUS EVERY 6 HOURS PRN
Status: DISCONTINUED | OUTPATIENT
Start: 2019-05-26 | End: 2019-05-28

## 2019-05-26 RX ORDER — BUPIVACAINE HYDROCHLORIDE 2.5 MG/ML
10 INJECTION, SOLUTION EPIDURAL; INFILTRATION; INTRACAUDAL ONCE
Status: DISCONTINUED | OUTPATIENT
Start: 2019-05-26 | End: 2019-05-26

## 2019-05-26 RX ORDER — IBUPROFEN 200 MG
200 TABLET ORAL EVERY 4 HOURS PRN
Status: DISCONTINUED | OUTPATIENT
Start: 2019-05-26 | End: 2019-05-28

## 2019-05-26 RX ORDER — BISACODYL 10 MG
10 SUPPOSITORY, RECTAL RECTAL ONCE AS NEEDED
Status: DISCONTINUED | OUTPATIENT
Start: 2019-05-26 | End: 2019-05-28

## 2019-05-26 RX ORDER — NALBUPHINE HCL 10 MG/ML
2.5 AMPUL (ML) INJECTION
Status: DISCONTINUED | OUTPATIENT
Start: 2019-05-26 | End: 2019-05-26

## 2019-05-26 RX ORDER — MISOPROSTOL 200 UG/1
1000 TABLET ORAL ONCE
Status: COMPLETED | OUTPATIENT
Start: 2019-05-26 | End: 2019-05-26

## 2019-05-26 RX ORDER — BUPIVACAINE HYDROCHLORIDE 2.5 MG/ML
30 INJECTION, SOLUTION EPIDURAL; INFILTRATION; INTRACAUDAL ONCE
Status: DISCONTINUED | OUTPATIENT
Start: 2019-05-26 | End: 2019-05-26

## 2019-05-26 RX ADMIN — BUPIVACAINE HYDROCHLORIDE 10 ML: 2.5 INJECTION, SOLUTION EPIDURAL; INFILTRATION; INTRACAUDAL at 14:01:00

## 2019-05-26 RX ADMIN — LIDOCAINE HYDROCHLORIDE 5 ML: 10 INJECTION, SOLUTION INFILTRATION; PERINEURAL at 14:01:00

## 2019-05-26 RX ADMIN — LIDOCAINE HYDROCHLORIDE AND EPINEPHRINE 5 ML: 15; 5 INJECTION, SOLUTION EPIDURAL at 14:01:00

## 2019-05-26 NOTE — L&D DELIVERY NOTE
Shenafrancis Jennifer [[id number]]    Labor Events     labor?:  No   steroids?:  None  Cervical ripening date/time:  2019 1437  Cervical ripening type:  Cervidil  Antibiotics received during labor?:  No  Antibiotics (enter # doses in co pale Acrocyanotic Completely pink    Heart rate Absent <100 bpm >100 bpm    Reflex irritability No response Grimace Cry or active withdrawal    Muscle tone Limp Some flexion Active motion    Respiratory effort Absent Weak cry; hypoventilation Good, crying vigorously upon delivery. Short umbilical cord.   Clamped, cut and baby placed with mother on chest.      Intake/Output   EBL:  250 ml      River Flynn MD   5/26/2019  5:13 PM

## 2019-05-26 NOTE — ANESTHESIA POSTPROCEDURE EVALUATION
Patient: Paige Champagne    Procedure Summary     Date:  05/26/19 Room / Location:      Anesthesia Start:  4616 Anesthesia Stop:  1549    Procedure:  LABOR ANALGESIA Diagnosis:      Scheduled Providers:   Anesthesiologist:  MD Anabel Izquierdo

## 2019-05-26 NOTE — ANESTHESIA PREPROCEDURE EVALUATION
Anesthesia PreOp Note    HPI:     Sree Mccallum is a 41year old female who presents for preoperative consultation requested by: * No surgeons listed *    Date of Surgery: 5/26/2019    * No procedures listed *  Indication: * No pre-op diagnosis entered * epidural infusion  Epidural Continuous Jarret Del Rosario MD     fentaNYL citrate (SUBLIMAZE) 0.05 MG/ML injection 100 mcg 100 mcg Epidural Once Jarret Del Rosario MD     EPHEDrine sulfate in NaCl 0.9% (PF) injection 5 mg 5 mg Intravenous PRN Barbara Chandler  mg at 05/26/19 0745    oxyTOCIN (PITOCIN) 30 units/ 500 ml 0.9% NS premix infusion 0.5-20 dutch-units/min Intravenous Continuous Rico Alston MD Last Rate: 20 mL/hr at 05/26/19 1100 20 dutch-units/min at 05/26/19 1100     [facility]  Service: Not Asked        Blood Transfusions: Not Asked        Caffeine Concern: Yes          soda/coffee - 1cup/day        Occupational Exposure: Not Asked        Hobby Hazards: Not Asked        Sleep Concern: Not Asked        Stress Concern: management. All of the patient's questions were answered to the best of my ability. The patient desires the anesthetic management as planned.   Charmaine Staff  5/26/2019 1:49 PM

## 2019-05-26 NOTE — ANESTHESIA PROCEDURE NOTES
Labor Analgesia  Performed by: Sayra Sandoval MD  Authorized by: Sayra Sandoval MD     Patient Location:  OB  Start Time:  5/26/2019 2:01 PM  End Time:  5/26/2019 2:01 PM  Reason for Block: labor epidural    Anesthesiologist:  Sayra Sandoval MD

## 2019-05-26 NOTE — PROGRESS NOTES
Patient states \"I was less itchy last night than I have been at home\"; patient currently has no complaint of itching

## 2019-05-26 NOTE — PROGRESS NOTES
RN encouraged pt to ambulate in castellanos, stand at bedside or using birthing ball; pt declined at this time; birthing ball at bedside

## 2019-05-26 NOTE — PROGRESS NOTES
Patient declines S2S with NB; FOB holding swaddled NB; patient states desire to breastfeed; when help offered to breastfeed in recovery patient stated, \"Im not ready\"; RN offered re attempt in 30 mins and offered education on colostrum and frequency of l

## 2019-05-26 NOTE — PROGRESS NOTES
[facility]    Labor Progress Note    Soledad Mccallum Patient Status:  Inpatient    1980 MRN [id number]   Location [facility] Attending Delbert Day MD   Hosp Day # 1 PCP MD Kam Jiménez

## 2019-05-26 NOTE — PROGRESS NOTES
[facility]    Labor Progress Note    [facility] Patient Status:  Inpatient    1980 MRN [id number]   Location [facility] [facility] Attending Thalia Seay MD   Hosp Day # 1 PCP MD Ronny Price

## 2019-05-27 RX ORDER — MELATONIN
325
Status: DISCONTINUED | OUTPATIENT
Start: 2019-05-27 | End: 2019-05-28

## 2019-05-27 NOTE — PROGRESS NOTES
[facility]    Labor Progress Note    Leonce Seip Patient Status:  Inpatient    1980 MRN [id number]   Location [address]  Attending Wendy Li MD   Hosp Day # 1 PCP MD Eddie Borja

## 2019-05-27 NOTE — PROGRESS NOTES
[facility]    OB/GYNE Progress Note      Noni Victoria Patient Status:  Inpatient    1980 MRN [id number]   Location [facility] 3SE Attending Emeka Obrien MD   Hosp Day # 2 PCP River Flynn MD       Subjective   N

## 2019-05-27 NOTE — LACTATION NOTE
LACTATION NOTE - MOTHER           Problems identified  Problems identified: Knowledge deficit;Milk supply not WNL  Milk supply not WNL: Reduced (potential)  Problems Identified Other: formula supplementation     Maternal history  Maternal history: PPH  Oth indication for supplementation, use of breast massage, hand expression, safe skin to skin care and breastfeeding log. Informed that formula and pacifiers may interfere with lactogenesis II, especially during the first two weeks postpartum.  Informed of AAP

## 2019-05-27 NOTE — PLAN OF CARE
Problem: Patient Centered Care  Goal: Patient preferences are identified and integrated in the patient's plan of care  Description  Interventions:  - What would you like us to know as we care for you?   - Provide timely, complete, and accurate informatio emotional support with 1:1 interaction with staff  Outcome: Progressing     Problem: COPING  Goal: Pt/Family able to verbalize concerns and demonstrate effective coping strategies  Description  INTERVENTIONS:  - Assist patient/family to identify coping ski maternal food preferences.   - Assess mother's knowledge and previous experience with breast feeding.  - Provide information as needed about early infant feeding cues (e.g., rooting, lip smacking, sucking fingers/hand) versus late cue of crying.  - Discuss/ systems available to mother/family.  - Provide /case management support as needed.   Outcome: Progressing

## 2019-05-27 NOTE — LACTATION NOTE
This note was copied from a baby's chart.   LACTATION NOTE - INFANT    Evaluation Type  Evaluation Type: Inpatient    Problems & Assessment  Problems Diagnosed or Identified: 37-38 weeks gestation  Infant Assessment: Hunger cues present;Oral mucous membrane

## 2019-05-28 ENCOUNTER — TELEPHONE (OUTPATIENT)
Dept: OBGYN CLINIC | Facility: CLINIC | Age: 39
End: 2019-05-28

## 2019-05-28 VITALS
TEMPERATURE: 97 F | WEIGHT: 157 LBS | SYSTOLIC BLOOD PRESSURE: 114 MMHG | HEART RATE: 82 BPM | HEIGHT: 62 IN | BODY MASS INDEX: 28.89 KG/M2 | DIASTOLIC BLOOD PRESSURE: 71 MMHG | RESPIRATION RATE: 18 BRPM

## 2019-05-28 DIAGNOSIS — Z87.19 HISTORY OF CHOLESTASIS DURING PREGNANCY: Primary | ICD-10-CM

## 2019-05-28 DIAGNOSIS — Z87.59 HISTORY OF CHOLESTASIS DURING PREGNANCY: Primary | ICD-10-CM

## 2019-05-28 RX ORDER — PSEUDOEPHEDRINE HCL 30 MG
100 TABLET ORAL 2 TIMES DAILY
Qty: 30 CAPSULE | Refills: 0 | Status: SHIPPED | OUTPATIENT
Start: 2019-05-28

## 2019-05-28 RX ORDER — IBUPROFEN 400 MG/1
400 TABLET ORAL EVERY 6 HOURS PRN
Qty: 30 TABLET | Refills: 0 | Status: SHIPPED | OUTPATIENT
Start: 2019-05-28

## 2019-05-28 RX ORDER — MELATONIN
325
Qty: 30 TABLET | Refills: 0 | Status: SHIPPED | OUTPATIENT
Start: 2019-05-28

## 2019-05-28 RX ORDER — URSODIOL 300 MG/1
300 CAPSULE ORAL 2 TIMES DAILY
Qty: 60 CAPSULE | Refills: 0 | Status: SHIPPED | OUTPATIENT
Start: 2019-05-28

## 2019-05-28 NOTE — TELEPHONE ENCOUNTER
Orders entered x 4 for weekly ALT/AST draw with [facility] as ordering provider. Pt currently admitted. Routing to [employer] as FYI.

## 2019-05-28 NOTE — DISCHARGE SUMMARY
[facility]    Discharge Summary/Discharge Note    Gela Acosta Patient Status:  Inpatient    1980 MRN [id number]   Location [facility] 3SE Attending Yadira Hughes MD   Hosp Day # 3 PCP Ernestina Doll MD       Sub Negative 10/23/2018    GLUUR neg 05/21/2019    KETUR Trace (A) 10/23/2018    BILUR Negative 10/23/2018    BLOODURINE Negative 10/23/2018    NITRITE neg 05/21/2019    UROBILINOGEN <2.0 10/23/2018    LEUUR Negative 10/23/2018    UASA Negative 10/23/2018 6/10/19  Gestational Age: 41w10d  Date of Delivery: 5/26/2019   Time of Delivery: 4:45 PM  Delivery Type: Normal spontaneous vaginal delivery  Maternal Anesthesia: spinal   Antepartum complications: 1. Advanced maternal age  3.   Benign gestational thromboc

## 2019-05-28 NOTE — LACTATION NOTE
Mom states that she feels confident with breastfeeding infant. Encouraged exclusive feeding at the breast and pumping if a supplement is needed.

## 2019-05-28 NOTE — TELEPHONE ENCOUNTER
Please schedule patient for weekly AST/ALT blood draws. They should be drawn under Dr. María Gonzalez name since he was her delivering provider and will see her for her postpartum care.

## 2019-05-28 NOTE — PLAN OF CARE
Problem: Patient Centered Care  Goal: Patient preferences are identified and integrated in the patient's plan of care  Description  Interventions:  - What would you like us to know as we care for you?   - Provide timely, complete, and accurate informatio support with 1:1 interaction with staff  Outcome: Completed     Problem: COPING  Goal: Pt/Family able to verbalize concerns and demonstrate effective coping strategies  Description  INTERVENTIONS:  - Assist patient/family to identify coping skills, availab

## 2019-06-10 ENCOUNTER — APPOINTMENT (OUTPATIENT)
Dept: LAB | Facility: HOSPITAL | Age: 39
End: 2019-06-10
Attending: OBSTETRICS & GYNECOLOGY
Payer: COMMERCIAL

## 2019-06-10 DIAGNOSIS — Z87.59 HISTORY OF CHOLESTASIS DURING PREGNANCY: ICD-10-CM

## 2019-06-10 DIAGNOSIS — Z87.19 HISTORY OF CHOLESTASIS DURING PREGNANCY: ICD-10-CM

## 2019-06-10 PROCEDURE — 84460 ALANINE AMINO (ALT) (SGPT): CPT

## 2019-06-10 PROCEDURE — 84450 TRANSFERASE (AST) (SGOT): CPT

## 2019-06-10 PROCEDURE — 36415 COLL VENOUS BLD VENIPUNCTURE: CPT

## 2019-06-26 ENCOUNTER — TELEPHONE (OUTPATIENT)
Dept: OBGYN CLINIC | Facility: CLINIC | Age: 39
End: 2019-06-26

## 2019-06-27 ENCOUNTER — TELEPHONE (OUTPATIENT)
Dept: OBGYN CLINIC | Facility: CLINIC | Age: 39
End: 2019-06-27

## 2019-06-28 NOTE — TELEPHONE ENCOUNTER
Our office was supposed to have received a form from her husbands work regarding his and hers insurance. Pt Informed form was not received. Pt voices she will call her husbands work again to have form refaxed.

## 2019-06-29 NOTE — TELEPHONE ENCOUNTER
No fax received, spoke to patient she will try to make an attempt to refax it next week or stop by the office, aware needs to call ahead if plans to head out to the office, no further questions

## 2019-07-23 ENCOUNTER — POSTPARTUM (OUTPATIENT)
Dept: OBGYN CLINIC | Facility: CLINIC | Age: 39
End: 2019-07-23
Payer: COMMERCIAL

## 2019-07-23 VITALS — DIASTOLIC BLOOD PRESSURE: 70 MMHG | BODY MASS INDEX: 27 KG/M2 | WEIGHT: 148 LBS | SYSTOLIC BLOOD PRESSURE: 110 MMHG

## 2019-07-23 NOTE — PROGRESS NOTES
HPI:    Patient ID: Brendon Smith is a 46year old female. Patient here for postpartum exam.  S/P  with Dr Manish Wilson. No C/Os. Declines contraception. Encouraged condoms. Breast and bottle feeding.         Review of Systems   Constitutional: Linh Lopez encounter diagnosis)  All questions answered. Encouraged condoms. No orders of the defined types were placed in this encounter.       Meds This Visit:  Requested Prescriptions      No prescriptions requested or ordered in this encounter       Imaging &

## 2019-09-06 ENCOUNTER — PATIENT MESSAGE (OUTPATIENT)
Dept: OBGYN CLINIC | Facility: CLINIC | Age: 39
End: 2019-09-06

## 2019-09-09 NOTE — TELEPHONE ENCOUNTER
Please refer to dermatologist Dr. Henny Lou. Her problem may possibly be allergic in which case she can see allergy specialist Dr. Don Aguilera.

## 2019-09-09 NOTE — TELEPHONE ENCOUNTER
Sai Bernstein Mai, RN 9/9/2019 1:11 PM CDT    Non pregnant pt. Pt delivered 5/26/2019 by Cheryle Boning.  RN routing to provider for consideration.  ----- Message -----  From: Maru Ames  Sent: 9/6/2019 9:51 PM CDT  To: Rika Arreolaob Ob/Gyne Clinical Staff  Mejia Allan

## 2019-10-24 ENCOUNTER — TELEPHONE (OUTPATIENT)
Dept: OBGYN CLINIC | Facility: CLINIC | Age: 39
End: 2019-10-24

## 2019-10-24 NOTE — TELEPHONE ENCOUNTER
Regarding: Other  Contact: [phone]  ----- Message from Quinton Tran MD sent at 10/24/2019 12:18 PM CDT -----       ----- Message from Nilo to Je Peterson MD sent at 10/23/2019 10:18 PM -----   Hi,   I and planning to go back to [place]

## 2024-09-10 ENCOUNTER — OFFICE VISIT (OUTPATIENT)
Dept: OBGYN CLINIC | Facility: CLINIC | Age: 44
End: 2024-09-10
Payer: COMMERCIAL

## 2024-09-10 VITALS
BODY MASS INDEX: 27.45 KG/M2 | WEIGHT: 149.19 LBS | SYSTOLIC BLOOD PRESSURE: 126 MMHG | DIASTOLIC BLOOD PRESSURE: 84 MMHG | HEIGHT: 62 IN

## 2024-09-10 DIAGNOSIS — Z01.419 WELL WOMAN EXAM WITH ROUTINE GYNECOLOGICAL EXAM: Primary | ICD-10-CM

## 2024-09-10 DIAGNOSIS — Z12.31 ENCOUNTER FOR SCREENING MAMMOGRAM FOR BREAST CANCER: ICD-10-CM

## 2024-09-10 DIAGNOSIS — R10.2 PELVIC PAIN: ICD-10-CM

## 2024-09-10 PROBLEM — O26.643 CHOLESTASIS DURING PREGNANCY IN THIRD TRIMESTER (HCC): Status: RESOLVED | Noted: 2019-05-25 | Resolved: 2024-09-10

## 2024-09-10 PROBLEM — D69.6 BENIGN GESTATIONAL THROMBOCYTOPENIA IN THIRD TRIMESTER (HCC): Status: RESOLVED | Noted: 2019-05-25 | Resolved: 2024-09-10

## 2024-09-10 PROBLEM — Z34.90 PREGNANCY (HCC): Status: RESOLVED | Noted: 2019-05-21 | Resolved: 2024-09-10

## 2024-09-10 PROBLEM — R33.9 URINARY RETENTION: Status: RESOLVED | Noted: 2019-05-26 | Resolved: 2024-09-10

## 2024-09-10 PROBLEM — O09.529 AMA (ADVANCED MATERNAL AGE) MULTIGRAVIDA 35+ (HCC): Status: RESOLVED | Noted: 2018-11-20 | Resolved: 2024-09-10

## 2024-09-10 PROBLEM — O99.113 BENIGN GESTATIONAL THROMBOCYTOPENIA IN THIRD TRIMESTER (HCC): Status: RESOLVED | Noted: 2019-05-25 | Resolved: 2024-09-10

## 2024-09-10 PROCEDURE — 3079F DIAST BP 80-89 MM HG: CPT | Performed by: OBSTETRICS & GYNECOLOGY

## 2024-09-10 PROCEDURE — 99202 OFFICE O/P NEW SF 15 MIN: CPT | Performed by: OBSTETRICS & GYNECOLOGY

## 2024-09-10 PROCEDURE — 3074F SYST BP LT 130 MM HG: CPT | Performed by: OBSTETRICS & GYNECOLOGY

## 2024-09-10 PROCEDURE — 3008F BODY MASS INDEX DOCD: CPT | Performed by: OBSTETRICS & GYNECOLOGY

## 2024-09-10 PROCEDURE — 99386 PREV VISIT NEW AGE 40-64: CPT | Performed by: OBSTETRICS & GYNECOLOGY

## 2024-09-10 NOTE — PROGRESS NOTES
HPI:    Patient ID: Clotilde Sands is a 44 year old year old female.    HPI  New patient  Well woman visit  44-year-old  2 para 2-0-0-2.  Last menstrual period-2024.  Menses are regular, monthly, and normal.  5 days long and neither heavy nor painful.  She complains of noting transient, brief, sharp right lower quadrant pains that go away and are random.  States that she had a full body scan through work and was told uterus may be mildly enlarged.  She notes axillary soreness for 3 weeks out of the month and thinks that her glands on her right side swell but then goes away for a week.  Has premenstrual mastalgia.  Is interested in birth control.  Has had unprotected sex since delivering her last child.  Has used patches and oral contraceptives in the past.  Would be most interested in patches as she tends to be forgetful for pills.  Advised that we can order contraceptive patches after she gets her mammogram done    Review of Systems   Constitutional: Negative.    Cardiovascular: Negative.    Gastrointestinal: Negative.    Genitourinary: Negative.    Skin: Negative.    Neurological: Negative.    Psychiatric/Behavioral: Negative.     All other systems reviewed and are negative.       Current Outpatient Medications   Medication Sig Dispense Refill    docusate sodium 100 MG Oral Cap Take 100 mg by mouth 2 (two) times daily. 30 capsule 0    ferrous sulfate 325 (65 FE) MG Oral Tab EC Take 1 tablet (325 mg total) by mouth daily with breakfast. 30 tablet 0    ibuprofen 400 MG Oral Tab Take 1 tablet (400 mg total) by mouth every 6 (six) hours as needed for Pain. 30 tablet 0    ursodiol 300 MG Oral Cap Take 1 capsule (300 mg total) by mouth 2 (two) times daily. 60 capsule 0    diphenhydrAMINE HCl 50 MG Oral Cap Take 50 mg by mouth every 6 (six) hours as needed for Itching.      Prenatal Vit-Fe Fumarate-FA (PRENATAL PLUS) 27-1 MG Oral Tab Take 1 tablet by mouth daily.         Past Medical History:     Benign gestational thrombocytopenia in third trimester (HCC)    Cholestasis during pregnancy in third trimester (HCC)    Cholestasis during pregnancy in third trimester (HCC)    Decorative tattoo    Dysplasia of cervix    per NG: \"dysplasia\"    Pregnancy (HCC)    perganccy management - Cervidil/Pitocin     Third-stage postpartum hemorrhage (HCC)    Urinary retention    Postpartum          Past Surgical History:   Procedure Laterality Date    Leep  2006      2012    ML episiotomy    Repair rotator cuff,acute Right 2011       Family History   Problem Relation Age of Onset    High Cholesterol Father     High Blood Pressure Father     Diabetes Father     High Cholesterol Mother     Asthma Mother     Breast Cancer Maternal Grandmother        Social History     Socioeconomic History    Marital status:      Spouse name: Not on file    Number of children: Not on file    Years of education: Not on file    Highest education level: Not on file   Occupational History    Not on file   Tobacco Use    Smoking status: Never    Smokeless tobacco: Never   Substance and Sexual Activity    Alcohol use: Yes     Comment: occasionally    Drug use: No    Sexual activity: Not on file   Other Topics Concern     Service Not Asked    Blood Transfusions Not Asked    Caffeine Concern Yes     Comment: soda/coffee - 1cup/day    Occupational Exposure Not Asked    Hobby Hazards Not Asked    Sleep Concern Not Asked    Stress Concern Not Asked    Weight Concern Not Asked    Special Diet Not Asked    Back Care Not Asked    Exercise Not Asked    Bike Helmet Not Asked    Seat Belt Not Asked    Self-Exams Not Asked   Social History Narrative    Not on file     Social Determinants of Health     Financial Resource Strain: Not on file   Food Insecurity: Not on file   Transportation Needs: Not on file   Physical Activity: Not on file   Stress: Not on file   Social Connections: Not on file   Housing Stability: Not on file       Physical Exam      Vitals: There were no vitals taken for this visit.    Constitutional: She appears well-developed and well-nourished.     Musculoskeletal: Normal range of motion of upper and lower extremities.   Neurological: She is alert and oriented x 3.   Skin: Skin is warm without pallor.  Psychiatric: Her behavior is normal. Judgment normal.  Able to communicate verbally.    HEENT:  EOMI.  DIANE.  Sclera anicteric.    Head: Normocephalic.  Normal hair distribution.  No lesions.  Neck: Normal range of motion.      Adenopathy:  No supraclavicular or cervical adenopathy.  Thyroid:  Normal size, shape, and position.  No masses, tenderness, or nodules.  Cardiovascular: Normal rate and regular rhythm.    Pulmonary/Chest: Effort normal.   Abdominal: Soft. Normal appearance and bowel sounds are normal. She exhibits no mass. There is no hepatosplenomegaly. There is no tenderness. There is no rebound and no CVA tenderness. No hernia. Hernia negative in the ventral area,  negative in the right inguinal area and negative in the left inguinal area.   Lymphadenopathy:        Right: No inguinal adenopathy present.        Left: No inguinal adenopathy present.     Breasts:    Symmetric bilaterally.  Areolas without lesions.  Skin- normal without growths, lesions, erythema or peau d'orange change.  Nipples- without retraction or discharge.  No masses, lumps, skin changes, erythema, or lesions.  Axilla-  No adenopathy, mass, or tenderness.    Genitourinary:   Pelvic exam was performed with patient supine and chaperone present.  External genitalia- normal.  [place]'s and [place]'s glands normal.  Urethral meatus- without lesions, mass, or discharge.  Urethra- normal without lesion, cyst, mass, or tenderness.  Vulva- normal.  Labia majora and minora without lesions.   Vagina- normal, no lesions or discharge.  Moist and well supported.  Bladder-  nontender.  No masses.  Normal support.  No evidence of cystocele,  abnormal bladder neck mobility  or evident urinary incontinence.  Cervix- smooth, normal epithelium without lesions or discharge.  No motion tenderness.   Uterus- normal size, shape, and contour.  Nontender.  No masses.  Adnexa-  Nontender, no masses.   Perineum- normal without lesions  Anus-  Normal appearing without lesions.    ASSESSMENT/PLAN:      ICD-10-CM    1. Well woman exam with routine gynecological exam  Z01.419       2.  Axillary tenderness, question adenitis.  Normal examination.  Desire for birth control patch.  To order after screening mammogram  3.  Pelvic pain.  Transient right lower quadrant.  Normal pelvic examination.  Patient requests pelvic ultrasound.    Normal exam.  Pap/HPV co-test every 3 years when previous normal/-HPV.  Monthly self breast exam.  Annual screening mammograms.  Contraception discussed as needed.  Screening colonoscopy at age 51, earlier if indicated.  Recommend regular exercise and quality diet.  Return to clinic in one year or as needed.      Outpatient Encounter Medications as of 9/10/2024   Medication Sig Dispense Refill    docusate sodium 100 MG Oral Cap Take 100 mg by mouth 2 (two) times daily. 30 capsule 0    ferrous sulfate 325 (65 FE) MG Oral Tab EC Take 1 tablet (325 mg total) by mouth daily with breakfast. 30 tablet 0    ibuprofen 400 MG Oral Tab Take 1 tablet (400 mg total) by mouth every 6 (six) hours as needed for Pain. 30 tablet 0    ursodiol 300 MG Oral Cap Take 1 capsule (300 mg total) by mouth 2 (two) times daily. 60 capsule 0    diphenhydrAMINE HCl 50 MG Oral Cap Take 50 mg by mouth every 6 (six) hours as needed for Itching.      Prenatal Vit-Fe Fumarate-FA (PRENATAL PLUS) 27-1 MG Oral Tab Take 1 tablet by mouth daily.       No facility-administered encounter medications on file as of 9/10/2024.

## 2024-09-11 LAB — HPV E6+E7 MRNA CVX QL NAA+PROBE: NEGATIVE

## (undated) NOTE — LETTER
3/29/2019              [place] [id number]         Dear Mirtha Pac,    [facility] records indicate that the 1 hour Glucose, and CBC lab tests ordered for you by Sabi Dorsey. Kimberlee Shaw MD have not been done.  Please p

## (undated) NOTE — LETTER
1/8/2019              Yehuda Soulier        [facility] [id number]         To Whom It May Concern,    This letter is to confirm that Ms. Yehuda Soulier is a patient under my care and is currently pregnant.   Ms. Brendan Hutchinson is currently 26

## (undated) NOTE — LETTER
10/24/2019              [place] Deshpande [id number]         To whom it may concern,    Our patient Azalea Valencia has been under the care of our OB/GYN physicians and has been approved to return back to work with n

## (undated) NOTE — Clinical Note
Normal level 2 ultrasoundAdvanced maternal age, aneuploidy testing and screening have been declinedRECOMMENDATIONS:Continue care with Dr. Brittney Henderson Growth ultrasound at 26 weeks. Weekly NST's at 36 weeks.

## (undated) NOTE — LETTER
4/20/2018              Jeremiah Mansfield 43         Dear Gamal Trammell,    It was a pleasure to see you. Your PAP test was normal.  There is no need for further testing at this time.   I look forward to seeing you at yo

## (undated) NOTE — LETTER
VACCINE ADMINISTRATION RECORD  PARENT / GUARDIAN APPROVAL  Date: 2019  Vaccine administered to: Soledad Mccallum     : 1980    MRN: [id number]    A copy of the appropriate Centers for Disease Control and Prevention Vaccine Information statement